# Patient Record
Sex: MALE | Race: WHITE | NOT HISPANIC OR LATINO | Employment: OTHER | URBAN - METROPOLITAN AREA
[De-identification: names, ages, dates, MRNs, and addresses within clinical notes are randomized per-mention and may not be internally consistent; named-entity substitution may affect disease eponyms.]

---

## 2018-09-06 ENCOUNTER — RECORDS - HEALTHEAST (OUTPATIENT)
Dept: LAB | Facility: CLINIC | Age: 33
End: 2018-09-06

## 2018-09-07 LAB — TSH SERPL DL<=0.005 MIU/L-ACNC: 1.37 UIU/ML (ref 0.3–5)

## 2018-11-27 ENCOUNTER — RECORDS - HEALTHEAST (OUTPATIENT)
Dept: LAB | Facility: CLINIC | Age: 33
End: 2018-11-27

## 2018-11-28 LAB
AMPHETAMINES UR QL SCN: ABNORMAL
BARBITURATES UR QL: ABNORMAL
BENZODIAZ UR QL: ABNORMAL
CANNABINOIDS UR QL SCN: ABNORMAL
COCAINE UR QL: ABNORMAL
CREAT UR-MCNC: 163.8 MG/DL
OPIATES UR QL SCN: ABNORMAL
OXYCODONE UR QL: ABNORMAL
PCP UR QL SCN: ABNORMAL

## 2019-03-26 ENCOUNTER — RECORDS - HEALTHEAST (OUTPATIENT)
Dept: LAB | Facility: CLINIC | Age: 34
End: 2019-03-26

## 2019-03-27 LAB
ALBUMIN SERPL-MCNC: 4.2 G/DL (ref 3.5–5)
ALP SERPL-CCNC: 65 U/L (ref 45–120)
ALT SERPL W P-5'-P-CCNC: 10 U/L (ref 0–45)
ANION GAP SERPL CALCULATED.3IONS-SCNC: 9 MMOL/L (ref 5–18)
AST SERPL W P-5'-P-CCNC: 16 U/L (ref 0–40)
BILIRUB SERPL-MCNC: 0.3 MG/DL (ref 0–1)
BUN SERPL-MCNC: 13 MG/DL (ref 8–22)
CALCIUM SERPL-MCNC: 9.4 MG/DL (ref 8.5–10.5)
CHLORIDE BLD-SCNC: 102 MMOL/L (ref 98–107)
CO2 SERPL-SCNC: 29 MMOL/L (ref 22–31)
CREAT SERPL-MCNC: 0.98 MG/DL (ref 0.7–1.3)
ERYTHROCYTE [DISTWIDTH] IN BLOOD BY AUTOMATED COUNT: 12.4 % (ref 11–14.5)
GFR SERPL CREATININE-BSD FRML MDRD: >60 ML/MIN/1.73M2
GLUCOSE BLD-MCNC: 97 MG/DL (ref 70–125)
HCT VFR BLD AUTO: 38.8 % (ref 40–54)
HGB BLD-MCNC: 12.6 G/DL (ref 14–18)
LIPASE SERPL-CCNC: 12 U/L (ref 0–52)
MCH RBC QN AUTO: 30 PG (ref 27–34)
MCHC RBC AUTO-ENTMCNC: 32.5 G/DL (ref 32–36)
MCV RBC AUTO: 92 FL (ref 80–100)
PLATELET # BLD AUTO: 227 THOU/UL (ref 140–440)
PMV BLD AUTO: 10.5 FL (ref 8.5–12.5)
POTASSIUM BLD-SCNC: 3.7 MMOL/L (ref 3.5–5)
PROT SERPL-MCNC: 6.9 G/DL (ref 6–8)
RBC # BLD AUTO: 4.2 MILL/UL (ref 4.4–6.2)
SODIUM SERPL-SCNC: 140 MMOL/L (ref 136–145)
TSH SERPL DL<=0.005 MIU/L-ACNC: 1.04 UIU/ML (ref 0.3–5)
WBC: 5.5 THOU/UL (ref 4–11)

## 2019-06-24 ENCOUNTER — RECORDS - HEALTHEAST (OUTPATIENT)
Dept: ADMINISTRATIVE | Facility: OTHER | Age: 34
End: 2019-06-24

## 2019-06-25 ENCOUNTER — AMBULATORY - HEALTHEAST (OUTPATIENT)
Dept: SURGERY | Facility: CLINIC | Age: 34
End: 2019-06-25

## 2019-06-25 DIAGNOSIS — K40.90 LEFT INGUINAL HERNIA: ICD-10-CM

## 2019-07-02 ENCOUNTER — AMBULATORY - HEALTHEAST (OUTPATIENT)
Dept: SURGERY | Facility: CLINIC | Age: 34
End: 2019-07-02

## 2019-07-02 RX ORDER — SERTRALINE HYDROCHLORIDE 100 MG/1
200 TABLET, FILM COATED ORAL DAILY
Status: SHIPPED | COMMUNITY
Start: 2019-07-02

## 2019-07-02 RX ORDER — QUETIAPINE FUMARATE 25 MG/1
50 TABLET, FILM COATED ORAL 2 TIMES DAILY
Status: SHIPPED | COMMUNITY
Start: 2019-07-02

## 2019-07-02 RX ORDER — GABAPENTIN 800 MG/1
1600 TABLET ORAL 2 TIMES DAILY
Status: SHIPPED | COMMUNITY
Start: 2018-11-02

## 2019-07-02 RX ORDER — OXYCODONE AND ACETAMINOPHEN 5; 325 MG/1; MG/1
1 TABLET ORAL EVERY 6 HOURS PRN
Status: SHIPPED | COMMUNITY
Start: 2009-06-14

## 2019-07-02 RX ORDER — IBUPROFEN 800 MG/1
800 TABLET, FILM COATED ORAL EVERY 6 HOURS PRN
Status: SHIPPED | COMMUNITY
Start: 2019-07-02

## 2019-07-02 RX ORDER — METHADONE HYDROCHLORIDE 5 MG/1
10 TABLET ORAL EVERY 12 HOURS
Status: SHIPPED | COMMUNITY
Start: 2019-01-08

## 2019-09-06 ENCOUNTER — RECORDS - HEALTHEAST (OUTPATIENT)
Dept: ADMINISTRATIVE | Facility: OTHER | Age: 34
End: 2019-09-06

## 2019-09-06 ENCOUNTER — RECORDS - HEALTHEAST (OUTPATIENT)
Dept: LAB | Facility: CLINIC | Age: 34
End: 2019-09-06

## 2019-09-06 LAB
ALBUMIN SERPL-MCNC: 3.7 G/DL (ref 3.5–5)
ALP SERPL-CCNC: 86 U/L (ref 45–120)
ALT SERPL W P-5'-P-CCNC: 25 U/L (ref 0–45)
ANION GAP SERPL CALCULATED.3IONS-SCNC: 12 MMOL/L (ref 5–18)
AST SERPL W P-5'-P-CCNC: 28 U/L (ref 0–40)
BILIRUB SERPL-MCNC: 0.3 MG/DL (ref 0–1)
BUN SERPL-MCNC: 15 MG/DL (ref 8–22)
CALCIUM SERPL-MCNC: 9.4 MG/DL (ref 8.5–10.5)
CHLORIDE BLD-SCNC: 98 MMOL/L (ref 98–107)
CO2 SERPL-SCNC: 27 MMOL/L (ref 22–31)
CREAT SERPL-MCNC: 0.92 MG/DL (ref 0.7–1.3)
ERYTHROCYTE [SEDIMENTATION RATE] IN BLOOD BY WESTERGREN METHOD: 87 MM/HR (ref 0–15)
GFR SERPL CREATININE-BSD FRML MDRD: >60 ML/MIN/1.73M2
GLUCOSE BLD-MCNC: 138 MG/DL (ref 70–125)
POTASSIUM BLD-SCNC: 3.9 MMOL/L (ref 3.5–5)
PROT SERPL-MCNC: 7.3 G/DL (ref 6–8)
SODIUM SERPL-SCNC: 137 MMOL/L (ref 136–145)

## 2019-09-08 LAB
B BURGDOR IGG+IGM SER QL: 0.03 INDEX VALUE
CMV IGG SERPL IA-ACNC: >8 AI (ref 0–0.8)
EBV EA-D IGG SER-ACNC: 0.7 AI (ref 0–0.8)
EBV NA IGG SER IA-ACNC: 3.5 AI (ref 0–0.8)
EBV VCA IGG SER IA-ACNC: >8 AI (ref 0–0.8)

## 2019-09-09 ENCOUNTER — COMMUNICATION - HEALTHEAST (OUTPATIENT)
Dept: ADMINISTRATIVE | Facility: CLINIC | Age: 34
End: 2019-09-09

## 2019-09-11 ENCOUNTER — COMMUNICATION - HEALTHEAST (OUTPATIENT)
Dept: ADMINISTRATIVE | Facility: CLINIC | Age: 34
End: 2019-09-11

## 2019-09-11 ENCOUNTER — RECORDS - HEALTHEAST (OUTPATIENT)
Dept: ADMINISTRATIVE | Facility: OTHER | Age: 34
End: 2019-09-11

## 2019-09-12 ENCOUNTER — OFFICE VISIT - HEALTHEAST (OUTPATIENT)
Dept: INFECTIOUS DISEASES | Facility: CLINIC | Age: 34
End: 2019-09-12

## 2019-09-12 ENCOUNTER — RECORDS - HEALTHEAST (OUTPATIENT)
Dept: ADMINISTRATIVE | Facility: OTHER | Age: 34
End: 2019-09-12

## 2019-09-12 ENCOUNTER — AMBULATORY - HEALTHEAST (OUTPATIENT)
Dept: LAB | Facility: CLINIC | Age: 34
End: 2019-09-12

## 2019-09-12 ENCOUNTER — COMMUNICATION - HEALTHEAST (OUTPATIENT)
Dept: ADMINISTRATIVE | Facility: CLINIC | Age: 34
End: 2019-09-12

## 2019-09-12 DIAGNOSIS — R50.9 FEVER, UNSPECIFIED FEVER CAUSE: ICD-10-CM

## 2019-09-12 LAB
ALBUMIN SERPL-MCNC: 3 G/DL (ref 3.5–5)
ALP SERPL-CCNC: 105 U/L (ref 45–120)
ALT SERPL W P-5'-P-CCNC: 33 U/L (ref 0–45)
ANION GAP SERPL CALCULATED.3IONS-SCNC: 18 MMOL/L (ref 5–18)
AST SERPL W P-5'-P-CCNC: 44 U/L (ref 0–40)
BASOPHILS # BLD AUTO: 0 THOU/UL (ref 0–0.2)
BASOPHILS NFR BLD AUTO: 0 % (ref 0–2)
BILIRUB SERPL-MCNC: 0.2 MG/DL (ref 0–1)
BUN SERPL-MCNC: 16 MG/DL (ref 8–22)
C REACTIVE PROTEIN LHE: 41 MG/DL (ref 0–0.8)
CALCIUM SERPL-MCNC: 10 MG/DL (ref 8.5–10.5)
CHLORIDE BLD-SCNC: 99 MMOL/L (ref 98–107)
CO2 SERPL-SCNC: 26 MMOL/L (ref 22–31)
CREAT SERPL-MCNC: 0.76 MG/DL (ref 0.7–1.3)
EOSINOPHIL # BLD AUTO: 0.1 THOU/UL (ref 0–0.4)
EOSINOPHIL NFR BLD AUTO: 1 % (ref 0–6)
ERYTHROCYTE [DISTWIDTH] IN BLOOD BY AUTOMATED COUNT: 13.1 % (ref 11–14.5)
ERYTHROCYTE [SEDIMENTATION RATE] IN BLOOD BY WESTERGREN METHOD: 95 MM/HR (ref 0–15)
GFR SERPL CREATININE-BSD FRML MDRD: >60 ML/MIN/1.73M2
GLUCOSE BLD-MCNC: 100 MG/DL (ref 70–125)
HCT VFR BLD AUTO: 37.3 % (ref 40–54)
HGB BLD-MCNC: 12 G/DL (ref 14–18)
LDH SERPL L TO P-CCNC: 619 U/L (ref 125–220)
LIPASE SERPL-CCNC: <9 U/L (ref 0–52)
LYMPHOCYTES # BLD AUTO: 0.6 THOU/UL (ref 0.8–4.4)
LYMPHOCYTES NFR BLD AUTO: 5 % (ref 20–40)
MCH RBC QN AUTO: 29.6 PG (ref 27–34)
MCHC RBC AUTO-ENTMCNC: 32.2 G/DL (ref 32–36)
MCV RBC AUTO: 92 FL (ref 80–100)
MONOCYTES # BLD AUTO: 0.2 THOU/UL (ref 0–0.9)
MONOCYTES NFR BLD AUTO: 1 % (ref 2–10)
NEUTROPHILS # BLD AUTO: 11.8 THOU/UL (ref 2–7.7)
NEUTROPHILS NFR BLD AUTO: 92 % (ref 50–70)
PLATELET # BLD AUTO: 487 THOU/UL (ref 140–440)
PMV BLD AUTO: 10.2 FL (ref 8.5–12.5)
POTASSIUM BLD-SCNC: 3.8 MMOL/L (ref 3.5–5)
PROT SERPL-MCNC: 7 G/DL (ref 6–8)
RBC # BLD AUTO: 4.06 MILL/UL (ref 4.4–6.2)
SODIUM SERPL-SCNC: 143 MMOL/L (ref 136–145)
WBC: 12.9 THOU/UL (ref 4–11)

## 2019-09-13 ENCOUNTER — AMBULATORY - HEALTHEAST (OUTPATIENT)
Dept: FAMILY MEDICINE | Facility: CLINIC | Age: 34
End: 2019-09-13

## 2019-09-13 ENCOUNTER — RECORDS - HEALTHEAST (OUTPATIENT)
Dept: RADIOLOGY | Facility: CLINIC | Age: 34
End: 2019-09-13

## 2019-09-13 ENCOUNTER — COMMUNICATION - HEALTHEAST (OUTPATIENT)
Dept: INFECTIOUS DISEASES | Facility: CLINIC | Age: 34
End: 2019-09-13

## 2019-09-13 ENCOUNTER — HOSPITAL ENCOUNTER (OUTPATIENT)
Dept: CT IMAGING | Facility: CLINIC | Age: 34
Discharge: HOME | End: 2019-09-13
Attending: INTERNAL MEDICINE

## 2019-09-13 DIAGNOSIS — R11.2 NAUSEA AND VOMITING, INTRACTABILITY OF VOMITING NOT SPECIFIED, UNSPECIFIED VOMITING TYPE: ICD-10-CM

## 2019-09-13 DIAGNOSIS — B34.9 VIRAL SYNDROME: ICD-10-CM

## 2019-09-13 LAB
B BURGDOR IGG+IGM SER QL: 0.03 INDEX VALUE
MALARIA SMEAR BLD: NORMAL
T PALLIDUM AB SER QL: NEGATIVE

## 2019-09-14 LAB
H CAPSUL AG UR QL IA: NOT DETECTED
H CAPSUL AG UR-MCNC: NOT DETECTED NG/ML

## 2019-09-15 LAB
A PHAGOCYTOPH DNA BLD QL NAA+PROBE: NOT DETECTED
E CHAFFEENSIS DNA BLD QL NAA+PROBE: NOT DETECTED
E EWINGII DNA SPEC QL NAA+PROBE: NOT DETECTED
EEEV IGG TITR SER IF: NORMAL {TITER}
EEEV IGM TITR SER IF: NORMAL {TITER}
EHRLICHIA DNA SPEC QL NAA+PROBE: NOT DETECTED
LACV IGG TITR SER IF: NORMAL {TITER}
LACV IGM TITR SER IF: NORMAL {TITER}
SLEV IGG TITR SER IF: NORMAL {TITER}
SLEV IGM TITR SER IF: NORMAL {TITER}
WEEV IGG TITR SER IF: NORMAL {TITER}
WEEV IGM TITR SER IF: NORMAL {TITER}
WNV IGG SER IA-ACNC: 0.17 IV
WNV IGM SER IA-ACNC: 0.02 IV

## 2019-09-16 ENCOUNTER — AMBULATORY - HEALTHEAST (OUTPATIENT)
Dept: PULMONOLOGY | Facility: OTHER | Age: 34
End: 2019-09-16

## 2019-09-16 DIAGNOSIS — J18.9 PNEUMONIA: ICD-10-CM

## 2019-09-17 LAB
AEROBIC BLOOD CULTURE BOTTLE: NO GROWTH
AEROBIC BLOOD CULTURE BOTTLE: NO GROWTH
ANAEROBIC BLOOD CULTURE BOTTLE: NO GROWTH
ANAEROBIC BLOOD CULTURE BOTTLE: NO GROWTH

## 2019-09-18 LAB
B MICROTI DNA BLD QL NAA+PROBE: NOT DETECTED
BABESIA DNA BLD QL NAA+PROBE: NOT DETECTED

## 2019-09-20 ENCOUNTER — RECORDS - HEALTHEAST (OUTPATIENT)
Dept: LAB | Facility: CLINIC | Age: 34
End: 2019-09-20

## 2019-09-20 LAB
C DIFF TOX B STL QL: NEGATIVE
RIBOTYPE 027/NAP1/BI: NORMAL

## 2019-09-26 ENCOUNTER — OFFICE VISIT - HEALTHEAST (OUTPATIENT)
Dept: INFECTIOUS DISEASES | Facility: CLINIC | Age: 34
End: 2019-09-26

## 2019-09-26 DIAGNOSIS — J67.9 HYPERSENSITIVITY PNEUMONITIS (H): ICD-10-CM

## 2019-10-09 ENCOUNTER — HOSPITAL ENCOUNTER (OUTPATIENT)
Dept: CT IMAGING | Facility: HOSPITAL | Age: 34
Discharge: HOME | End: 2019-10-09
Attending: INTERNAL MEDICINE

## 2019-10-09 DIAGNOSIS — J18.9 PNEUMONIA: ICD-10-CM

## 2019-10-10 ENCOUNTER — OFFICE VISIT - HEALTHEAST (OUTPATIENT)
Dept: PULMONOLOGY | Facility: OTHER | Age: 34
End: 2019-10-10

## 2019-10-10 DIAGNOSIS — R91.8 PULMONARY INFILTRATES: ICD-10-CM

## 2019-10-10 DIAGNOSIS — J69.1: ICD-10-CM

## 2019-10-10 DIAGNOSIS — F17.200 TOBACCO USE DISORDER: ICD-10-CM

## 2019-10-10 ASSESSMENT — MIFFLIN-ST. JEOR: SCORE: 1508.25

## 2019-11-05 ENCOUNTER — HOSPITAL ENCOUNTER (OUTPATIENT)
Dept: RESPIRATORY THERAPY | Facility: HOSPITAL | Age: 34
Discharge: HOME | End: 2019-11-05
Attending: INTERNAL MEDICINE

## 2019-11-05 DIAGNOSIS — R91.8 PULMONARY INFILTRATES: ICD-10-CM

## 2019-11-05 LAB — HGB BLD-MCNC: 13.1 G/DL (ref 14–18)

## 2021-05-26 VITALS — SYSTOLIC BLOOD PRESSURE: 100 MMHG | TEMPERATURE: 98.2 F | DIASTOLIC BLOOD PRESSURE: 70 MMHG | HEART RATE: 100 BPM

## 2021-06-01 NOTE — TELEPHONE ENCOUNTER
[facility] (Katie on behalf of Dr Whitley) calling to report findings of patient's Xray taken today.    [phone] ask for Katie

## 2021-06-01 NOTE — TELEPHONE ENCOUNTER
Dipika     Pt LVM this AM @ 6am that he has important questions to ask before going to the ER. Please call him back @ [phone].

## 2021-06-01 NOTE — TELEPHONE ENCOUNTER
"I called the pt, he states the last 2 nights he has been experiencing constant \"churning\" in in stomach, abd pain, and cannot eat or drink without vomiting.The pt states that he is trying to eat ice chips slowly, however this makes him nauseated as well. I advised the pt to go to the ER because he is unable to eat and drink, and he stated he is getting weak. The pt denies wanting to go to the ER and just wants a CT of the abd. I informed I will speak to the MD and contact him back.   "

## 2021-06-01 NOTE — TELEPHONE ENCOUNTER
Dr. Jimenez ([facility]) would like a call back from the on call doctor.    [phone]

## 2021-06-01 NOTE — PROGRESS NOTES
I reviewed the CT chest with Dr. Chanel, pulmonology. He has diffuse infiltrates in both lungs. I am concerned for a rapidly progressive process. Infection possibly, but cannot rule-out hypersensitivity reaction due to h/o E-cigarettes.     I called patient to come to the ER. I informed the ER that he is coming. Recommend starting broad-spectrum antibiotics and procalcitonin. He will likely need a bronchoscopy tomorrow, and this was discussed with Dr. Chanel.    Of note, I called the patient as he was completing an abdominal CT for ongoing nausea and abdominal pain. Results are pending.    ID will also be consulted after admission.    Ryan Bar MD  [facility]  Direct messaging: [facility] Paging  On-Call ID provider: [phone], option: [phone]

## 2021-06-01 NOTE — TELEPHONE ENCOUNTER
External - Quintenra Abundio Huber  Referring Provider: Raphael Eng MD  DX: Recurring fever, high sed rate, cmv, and EBV titers.     Ref./rec. Were received on 09/11/2019 in Consult folder   records printed and on css desk

## 2021-06-01 NOTE — TELEPHONE ENCOUNTER
Per Dr Bar, he had spoke to pt and informed of xray reviewed.  Called pt to help schedule CT at [facility] [place]note 2[place] sometime today. Faxed order [phone]

## 2021-06-01 NOTE — PROGRESS NOTES
"[facility] INFECTIOUS DISEASE CLINIC     Date: 9/26/2019  Patient Name: Cresencio Ruiz   YOB: 1985  MRN: [id number]      ASSESSMENT:  34-year-old man referred to ID clinic for about 2 weeks of fevers, nausea, and weight loss.  CXR and chest CT showed pneumonia vs pneumonitis. He was admitted to the hospital and thought to have hypersensitivity pneumonitis secondary to e-cigarette use. Antibiotics were discontinued and he was given prednisone. His fevers have stopped and he is beginning to gain weight again. All other infectious work-up was negative.      PLAN:  -continue prednisone taper as directed  -avoid dairy products for awhile and re-introduce slowly  -f/up with pulmonary with repeat CT, scheduled    Return to clinic as needed    Ryan Bar MD  [facility]   Clinic phone: [phone]   Clinic fax: [phone]     ______________________________________________________________________    HISTORY OF PRESENT ILLNESS:   From initial visit:    Cresencio Ruiz is a 34 y.o. man who is referred for evaluation of fevers.  Patient has a history of anxiety, chronic pain OCD, PTSD, and restless leg syndrome.  He was in his normal state of health up until Labor Day developed acute onset of chills and fevers.  He reports daily fevers in the evening.  He also has a \"churning\" feeling in his abdomen, occasionally vomiting after meals.  He has intermittent headache.  He denies sore throat, but does have dry mouth, and is drinking a lot of water.  He has subjective shortness of breath, but no cough or wheezing.  He is normally constipated, but he has been having looser stools lately.  He reports about 12 pound weight loss since his symptoms started. He denies any new medications.  He denies any known sick contacts.  He lives in [place] with his parents.  His fiancée lives in [place], and he cuts her grass.  He also went paddle boating [place] [facility] [place] [place], but denies other " significant outdoor exposures.  He has 2 cats and 2 dogs.  He quit smoking, but currently uses e-cigarettes.  He occasionally uses marijuana, last time was about a week or 2 ago.  He denies alcohol use or other illicit drugs.    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  Reportedly this was a very large tumor and he suffers chronic pain from this area.  He gets yearly chest x-rays for surveillance.    Interval History:  Following his clinic visit, his x-ray was concerning for pneumonia.  Because of his e-cigarette use, he was sent for a CT scan of the chest which showed bilateral opacities.  He was directed to the ER for admission.  They are, his O2 sats were 85%.  His procalcitonin was normal.  He was seen by pulmonary and ID.  His presentation was highly suggestive of hypersensitivity pneumonitis.  Antibiotics were discontinued.  Prednisone was started.  He was discharged within a few days and is feeling great.  Denies any fevers.  Feels that he has gained 5 pounds already.  He has some sensitivity to dairy right now.  He denies any shortness of breath or cough.  He is still on a prednisone taper.  He has an appointment in a few weeks with pulmonary for repeat CT scan.      Review of Systems:  No fevers no rashes    Past Medical History:  Past Medical History:   Diagnosis Date     Adhesive arachnoiditis      Anxiety      Chest pain      Chronic pain syndrome      Controlled substance agreement signed      VASILE (generalized anxiety disorder)      Intracranial injury (H)      Memory loss      Mixed obsessional thoughts and acts      OCD (obsessive compulsive disorder)      PTSD (post-traumatic stress disorder)      Restless leg      Rib pain on right side     chronic, pain after tumor removal, possible arachnoiditis     Seizure disorder (H)     post traumatic, single episode       Past Surgical History:  Past Surgical History:   Procedure Laterality Date     ganglioneuroma  2009     right lung     INGUINAL HERNIA REPAIR  2014    right       Allergies:  Allergies   Allergen Reactions     Morphine      Other reaction(s): Agitation       Medications:    Current Outpatient Medications:      dimenhyDRINATE (DRAMAMINE) 50 mg Chew, Chew 50 mg every 12 (twelve) hours.    , Disp: , Rfl:      famotidine (PEPCID) 20 MG tablet, Take 1 tablet (20 mg total) by mouth 2 (two) times a day., Disp: , Rfl: 0     gabapentin (NEURONTIN) 800 MG tablet, Take 1,600 mg by mouth 2 (two) times a day.    , Disp: , Rfl:      methadone (DOLOPHINE) 5 MG tablet, Take 10 mg by mouth every 12 (twelve) hours.    , Disp: , Rfl:      PREDNISONE ORAL, Take by mouth 2 (two) times a day. Tapering off. Last day 9/30/19, Disp: , Rfl:      QUEtiapine (SEROQUEL) 25 MG tablet, Take 50 mg by mouth 2 (two) times a day.    , Disp: , Rfl:      sertraline (ZOLOFT) 100 MG tablet, Take 200 mg by mouth daily.    , Disp: , Rfl:      acetaminophen (TYLENOL) 500 MG tablet, Take 1,000 mg by mouth every 4 (four) hours as needed for pain., Disp: , Rfl:      ibuprofen (ADVIL,MOTRIN) 800 MG tablet, Take 800 mg by mouth every 6 (six) hours as needed for pain., Disp: , Rfl:      LORazepam (ATIVAN) 1 MG tablet, Take 1 mg by mouth 2 (two) times a day as needed for anxiety., Disp: , Rfl:      multivitamin/iron/folic acid (CENTRUM ORAL), Take 1 tablet by mouth daily.    , Disp: , Rfl:      naloxone (NARCAN) 4 mg/actuation nasal spray, USE 4MG INTRANASALY ONCE AS NEEDED, Disp: , Rfl:      ondansetron (ZOFRAN ODT) 4 MG disintegrating tablet, Take 1 tablet (4 mg total) by mouth every 8 (eight) hours as needed for nausea., Disp: 12 tablet, Rfl: 0     oxyCODONE-acetaminophen (PERCOCET/ENDOCET) 5-325 mg per tablet, Take 1 tablet by mouth every 6 (six) hours as needed.    , Disp: , Rfl:     Social History:  Social History     Socioeconomic History     Marital status: Single     Spouse name: Not on file     Number of children: Not on file     Years of education: Not  on file     Highest education level: Not on file   Occupational History     Not on file   Social Needs     Financial resource strain: Not on file     Food insecurity:     Worry: Not on file     Inability: Not on file     Transportation needs:     Medical: Not on file     Non-medical: Not on file   Tobacco Use     Smoking status: Former Smoker     Smokeless tobacco: Former User     Quit date: 9/11/2019     Tobacco comment: 1-5 years, ecig daily use until a few days ago   Substance and Sexual Activity     Alcohol use: Not Currently     Frequency: Never     Comment: occassional beer     Drug use: Yes     Types: Marijuana     Comment: used marijuana 2-3 weeks ago     Sexual activity: Not on file   Lifestyle     Physical activity:     Days per week: Not on file     Minutes per session: Not on file     Stress: Not on file   Relationships     Social connections:     Talks on phone: Not on file     Gets together: Not on file     Attends [employer] service: Not on file     Active member of club or organization: Not on file     Attends meetings of clubs or organizations: Not on file     Relationship status: Not on file     Intimate partner violence:     Fear of current or ex partner: Not on file     Emotionally abused: Not on file     Physically abused: Not on file     Forced sexual activity: Not on file   Other Topics Concern     Not on file   Social History Narrative     Not on file        Family History:  Family History   Problem Relation Age of Onset     Depression Father            PHYSICAL EXAM:    Vitals:    09/26/19 1045   BP: 102/74   Pulse: 80   Temp: 98  F (36.7  C)       GENERAL: No distress  HENT:  Head is normocephalic, atraumatic.   EYES:  Eyes have anicteric sclerae without conjunctival injection   LUNGS:  Clear to auscultation.  CARDIOVASCULAR:  Regular rate and rhythm with no murmurs, gallops or rubs.  SKIN:  No acute rashes. No stigmata of endocarditis.  NEUROLOGIC: Grossly nonfocal. Normal gait and  station        Pertinent labs:            Lab Results   Component Value Date    CRP 41.0 (H) 09/12/2019         Lab Results   Component Value Date    ALT 33 09/12/2019    AST 44 (H) 09/12/2019    ALKPHOS 105 09/12/2019    BILITOT 0.2 09/12/2019     Outside labs from 9/6: WBC 9.5, hemoglobin 12, platelets 212, elevated neutrophils      Sed rate 87,  HIV negative  Lyme screen negative  CMV IgG positive  EBV IgG positive, EBNA positive    MICROBIOLOGY DATA:  Reviewed    RADIOLOGY:  Reviewed

## 2021-06-01 NOTE — PROGRESS NOTES
Impression from CT scan dated 9/12/19:   Extensive infiltrates throughout both lungs compatible with pneumonia, likely from infectious etiology. Other considerations would include edema from toxic inhalation or drug reaction.   Please advise if any further treatment or testing is needed. I am working with IT to get the report in Epic electronically.

## 2021-06-01 NOTE — PROGRESS NOTES
"[facility] INFECTIOUS DISEASE CLINIC     Date: 9/12/2019  Patient Name: Cresencio Ruiz   YOB: 1985  MRN: [id number]      ASSESSMENT:  34-year-old man referred to ID clinic for about 2 weeks of fevers, nausea, and weight loss.  Broad differential for fevers.  Given abrupt onset and acute symptoms, likely infectious.  No obvious exposures or travel.  No focal source of infection.  No obvious abnormalities on labs other than elevated sed rate.  Tick and mosquito borne illnesses are a concern.  Acute viral infection could present in this way (both respiratory viruses or arboviruses like West Nile).  CMV and EBV serologies indicate previous infection, likely not contributing to his current symptoms.    PLAN:  -Empiric treatment with doxycycline x2 weeks  -Blood cultures x2, parasite smear, Lyme serologies, Anaplasma/Ehrlichia PCR, Babesia PCR, syphilis screen, urine histoplasma antigen, CRP, ESR, CMP, CBC with differential, LDH  -Chest x-ray  -Continue ibuprofen fevers unless this is causing stomach upset can also combine with acetaminophen.    Return to clinic in 2 weeks.    Ryan Bar MD  [facility]   Clinic phone: [phone]   Clinic fax: [phone]     ______________________________________________________________________    HISTORY OF PRESENT ILLNESS:   Cresencio Ruiz is a 34 y.o. man who is referred for evaluation of fevers.  Patient has a history of anxiety, chronic pain OCD, PTSD, and restless leg syndrome.  He was in his normal state of health up until Labor Day developed acute onset of chills and fevers.  He reports daily fevers in the evening.  He also has a \"churning\" feeling in his abdomen, occasionally vomiting after meals.  He has intermittent headache.  He denies sore throat, but does have dry mouth, and is drinking a lot of water.  He has subjective shortness of breath, but no cough or wheezing.  He is normally constipated, but he has been having looser stools " lately.  He reports about 12 pound weight loss since his symptoms started. He denies any new medications.  He denies any known sick contacts.  He lives in [place] with his parents.  His fiancée lives in [place], and he cuts her grass.  He also [place] [place], but denies other significant outdoor exposures.  He has 2 cats and 2 dogs.  He quit smoking, but currently uses e-cigarettes.  He occasionally uses marijuana, last time was about a week or 2 ago.  He denies alcohol use or other illicit drugs.    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  Reportedly this was a very large tumor and he suffers chronic pain from this area.  He gets yearly chest x-rays for surveillance.    Interval History:  Not applicable      Review of Systems:  Ten systems reviewed and negative except for what is noted in the HPI     Past Medical History:  Past Medical History:   Diagnosis Date     Adhesive arachnoiditis      Anxiety      Chest pain      Chronic pain syndrome      Controlled substance agreement signed      VASILE (generalized anxiety disorder)      Intracranial injury (H)      Memory loss      Mixed obsessional thoughts and acts      OCD (obsessive compulsive disorder)      PTSD (post-traumatic stress disorder)      Restless leg      Rib pain on right side     chronic, pain after tumor removal, possible arachnoiditis     Seizure disorder (H)     post traumatic, single episode       Past Surgical History:  Past Surgical History:   Procedure Laterality Date     ganglioneuroma  2009    right lung     INGUINAL HERNIA REPAIR  2014    right       Allergies:  Allergies   Allergen Reactions     Morphine      Other reaction(s): Agitation       Medications:    Current Outpatient Medications:      dimenhyDRINATE (DRAMAMINE) 50 mg Chew, Chew 50 mg every 6 (six) hours., Disp: , Rfl:      gabapentin (NEURONTIN) 800 MG tablet, Take 1,600 mg by mouth., Disp: , Rfl:       ibuprofen (ADVIL,MOTRIN) 800 MG tablet, Take 800 mg by mouth every 6 (six) hours as needed for pain., Disp: , Rfl:      LORazepam (ATIVAN) 1 MG tablet, TAKE 1/2 TO 1 TABLET BY MOUTH 3 TIMES DAILY AS NEEDED FOR ANXIETY, Disp: , Rfl: 0     methadone (DOLOPHINE) 5 MG tablet, , Disp: , Rfl:      multivitamin/iron/folic acid (CENTRUM ORAL), Take by mouth., Disp: , Rfl:      naloxone (NARCAN) 4 mg/actuation nasal spray, USE 4MG INTRANASALY ONCE AS NEEDED, Disp: , Rfl:      ondansetron (ZOFRAN ODT) 4 MG disintegrating tablet, Take 1 tablet (4 mg total) by mouth every 8 (eight) hours as needed for nausea., Disp: 12 tablet, Rfl: 0     oxyCODONE-acetaminophen (PERCOCET/ENDOCET) 5-325 mg per tablet, Take by mouth., Disp: , Rfl:      QUEtiapine (SEROQUEL) 25 MG tablet, Take 25 mg by mouth at bedtime., Disp: , Rfl:      sertraline (ZOLOFT) 100 MG tablet, Take 100 mg by mouth daily., Disp: , Rfl:     Social History:  Social History     Socioeconomic History     Marital status: Single     Spouse name: Not on file     Number of children: Not on file     Years of education: Not on file     Highest education level: Not on file   Occupational History     Not on file   Social Needs     Financial resource strain: Not on file     Food insecurity:     Worry: Not on file     Inability: Not on file     Transportation needs:     Medical: Not on file     Non-medical: Not on file   Tobacco Use     Smoking status: Former Smoker     Smokeless tobacco: Never Used     Tobacco comment: 1-5 years, ecig currently using   Substance and Sexual Activity     Alcohol use: Not Currently     Frequency: Never     Comment: occassional beer     Drug use: Not on file     Sexual activity: Not on file   Lifestyle     Physical activity:     Days per week: Not on file     Minutes per session: Not on file     Stress: Not on file   Relationships     Social connections:     Talks on phone: Not on file     Gets together: Not on file     Attends [employer] service: Not  on file     Active member of club or organization: Not on file     Attends meetings of clubs or organizations: Not on file     Relationship status: Not on file     Intimate partner violence:     Fear of current or ex partner: Not on file     Emotionally abused: Not on file     Physically abused: Not on file     Forced sexual activity: Not on file   Other Topics Concern     Not on file   Social History Narrative     Not on file        Family History:  Family History   Problem Relation Age of Onset     Depression Father            PHYSICAL EXAM:    Vitals:    09/12/19 1028   BP: 100/70   Pulse: 100   Temp: 98.2  F (36.8  C)       GENERAL: Thin man, uncomfortable, somewhat anxious  HENT:  Head is normocephalic, atraumatic. Oropharynx is moist without exudates or ulcers.  EYES:  Eyes have anicteric sclerae without conjunctival injection or stigmata of endocarditis.    Lymph: No cervical, axillary, or inguinal lymphadenopathy  LUNGS:  Clear to auscultation.  CARDIOVASCULAR:  Regular rate and rhythm with no murmurs, gallops or rubs.  ABDOMEN:  Normal bowel sounds, soft, nontender. No hepatosplenomegaly.  MUSCULOSKELETAL: Extremities warm and without edema. No joint swelling.  Tenderness to palpation around the site of his previous surgery on his back.  SKIN:  No acute rashes. No stigmata of endocarditis.  NEUROLOGIC: Grossly nonfocal. Normal gait and station        Pertinent labs:        Results from last 7 days   Lab Units 09/06/19  1220   LN-SODIUM mmol/L 137   LN-POTASSIUM mmol/L 3.9   LN-CHLORIDE mmol/L 98   LN-CO2 mmol/L 27   LN-BLOOD UREA NITROGEN mg/dL 15   LN-CREATININE mg/dL 0.92   LN-CALCIUM mg/dL 9.4     No results found for: CRP      Lab Results   Component Value Date    ALT 25 09/06/2019    AST 28 09/06/2019    ALKPHOS 86 09/06/2019    BILITOT 0.3 09/06/2019     Outside labs from 9/6: WBC 9.5, hemoglobin 12, platelets 212, elevated neutrophils      Sed rate 87,  HIV negative  Lyme screen negative  CMV IgG  positive  EBV IgG positive, EBNA positive    MICROBIOLOGY DATA:  None    RADIOLOGY:  None

## 2021-06-01 NOTE — PROGRESS NOTES
Patient called worried about ongoing nausea and vomiting. Re-iterated recommendation to go to the ER for hydration, however patient and fiance are reluctant. Overall they feel he is getting fluids and keeping these down. He is making regular urine.    He started levofloxacin, but did not start doxycycline. I would still want to continue both of these.    I have ordered a CT abdomen due to ongoing nausea and vomiting, along with fevers.

## 2021-06-01 NOTE — PROGRESS NOTES
Chest xray reviewed with radiologist. Xray shows peribronchiolar inflammation in the RML and DANIEL. No obvious infiltrate to signify bacterial pneumonia.    Given ongoing illness, subjective shortness of breath (without cough), and h/o using E-cigarettes, I will order a Chest CT and add levofloxacin for better pneumonia coverage.    I attempted to call patient, but no answer. I left voice message to call clinic.    If respiratory symptoms worsen, he should come to the ER for admission.

## 2021-06-02 NOTE — PROGRESS NOTES
[facility] Visit    Problems Addressed Today  Problem List Items Addressed This Visit     Tobacco use disorder    Lipoid pneumonia (exogenous) (H)      Other Visit Diagnoses     Pulmonary infiltrates    -  Primary    Relevant Orders    PFT Complete        Assessment: 34-year-old man with pneumonia in the setting of the cigarette use.  No pathology available but I will clinically call us lipoid pneumonia because of its association with the cigarettes and I do not think it will be helpful for him to have hypersensitivity pneumonitis on his medical record.  Overall he is much improved.  He still has some pulmonary infiltrates but much less.    I anticipate that his course will mirror that of a short-term occupational exposure rather than a lipoid pneumonia associated with long-term occupational exposure.  Because of the lack of knowledge about the clinical course of this particular exposure we will check a set of formal pulmonary function test now.  Repeat them in 3 months with a chest x-ray.  We will determine further CT scans based on his clinical picture and pulmonary function tests.    I appreciate Dr. Eng providing further prednisone taper after his discharge from the hospital.  I think this was a good idea    Plans and recommendations:  No further prednisone for now  PFT now in 3 months  Chest x-ray in 3 months    Chief Complaint: Pneumonia    HPI: 34-year-old man admitted with e-cigarette related pneumonia last month.  He had significant fevers, shortness of breath and pulmonary infiltrates.  These improved with prednisone which was tapered after his discharge.  He has a little bit of abdominal pain but no joint symptoms no eye symptoms no skin symptoms.  He has chronic pain but no changes.  No other provocative, palliative or localizing factors.    Current Outpatient Medications on File Prior to Visit   Medication Sig Dispense Refill     acetaminophen (TYLENOL) 500 MG tablet Take 1,000  mg by mouth every 4 (four) hours as needed for pain.       dimenhyDRINATE (DRAMAMINE) 50 mg Chew Chew 50 mg every 12 (twelve) hours.              gabapentin (NEURONTIN) 800 MG tablet Take 1,600 mg by mouth 2 (two) times a day.              ibuprofen (ADVIL,MOTRIN) 800 MG tablet Take 800 mg by mouth every 6 (six) hours as needed for pain.       LORazepam (ATIVAN) 1 MG tablet Take 1 mg by mouth 2 (two) times a day as needed for anxiety.       methadone (DOLOPHINE) 5 MG tablet Take 10 mg by mouth every 12 (twelve) hours.              multivitamin/iron/folic acid (CENTRUM ORAL) Take 1 tablet by mouth daily.              naloxone (NARCAN) 4 mg/actuation nasal spray USE 4MG INTRANASALY ONCE AS NEEDED       oxyCODONE-acetaminophen (PERCOCET/ENDOCET) 5-325 mg per tablet Take 1 tablet by mouth every 6 (six) hours as needed.              QUEtiapine (SEROQUEL) 25 MG tablet Take 50 mg by mouth 2 (two) times a day.              sertraline (ZOLOFT) 100 MG tablet Take 200 mg by mouth daily.              [DISCONTINUED] famotidine (PEPCID) 20 MG tablet Take 1 tablet (20 mg total) by mouth 2 (two) times a day.  0     [DISCONTINUED] ondansetron (ZOFRAN ODT) 4 MG disintegrating tablet Take 1 tablet (4 mg total) by mouth every 8 (eight) hours as needed for nausea. 12 tablet 0     [DISCONTINUED] PREDNISONE ORAL Take by mouth 2 (two) times a day. Tapering off. Last day 9/30/19       No current facility-administered medications on file prior to visit.        Review of Systems: Negative x12 symptoms except as listed above  Medical History  Active Ambulatory (Non-Hospital) Problems    Diagnosis     Chest pain, unspecified     Anxiety state     Chronic pain disorder     VASILE (generalized anxiety disorder)     Mixed obsessional thoughts and acts     Toxic effect of other organic solvents, undetermined, initial encounter     Hypersensitivity pneumonitis (H)     Hypoxia     Tobacco use disorder     Viral syndrome     S/P thoracotomy     Past  "Medical History:   Diagnosis Date     Adhesive arachnoiditis      Anxiety      Chest pain      Chronic pain syndrome      Controlled substance agreement signed      VASILE (generalized anxiety disorder)      Intracranial injury (H)      Memory loss      Mixed obsessional thoughts and acts      OCD (obsessive compulsive disorder)      PTSD (post-traumatic stress disorder)      Restless leg      Rib pain on right side      Seizure disorder (H)         Surgical History  He  has a past surgical history that includes ganglioneuroma (2009) and Inguinal hernia repair (2014).       Social History  Reviewed, and he  reports that he has quit smoking. He quit smokeless tobacco use about 4 weeks ago. He reports previous alcohol use. He reports current drug use. Drug: Marijuana.       Allergies  Allergies   Allergen Reactions     Morphine      Other reaction(s): Agitation    Family History  Reviewed, and family history includes Depression in his father.          /58   Pulse 76   Ht 5' 8\" (1.727 m)   Wt 132 lb (59.9 kg)   SpO2 97% Comment: RA  BMI 20.07 kg/m    General: calm, normal body habitus  Eyes: no scleral injection or icterus, pupils equal and round  Nose: patent nares  Mouth: oropharynx benign, MP2  Neck: supple, no lymph nodes  Chest: nontender to palpation, no deformity  Lungs: Clear to auscultation, no wheezes or crackles.  CV: palpable radial pulses, RRR, no m/r/g, normal PMI  Abd: soft, nontender  Ext: no clubbing, no ulnar deviation of digits  Neuro: alert and interactive, no tremor or abnormal movements  Pscyh: normal affect appropriate to clinical scenario, no hallucination      Data Review - imaging, labs, and ekgs listed below were reviewed by me.  Chest XRay and chest CT images and EKG tracings interpreted personally.     Past Labs  Lab Requisition on 09/20/2019   Component Date Value     C.Difficile Toxigenic by* 09/20/2019 Negative      Ribotype 027/NAP1/B1 09/20/2019 Presumptive Negative  "   Admission on 09/13/2019, Discharged on 09/16/2019   Component Date Value     Sodium 09/13/2019 139      Potassium 09/13/2019 3.8      Chloride 09/13/2019 97*     CO2 09/13/2019 29      Anion Gap, Calculation 09/13/2019 13      Glucose 09/13/2019 99      Calcium 09/13/2019 9.7      BUN 09/13/2019 14      Creatinine 09/13/2019 0.75      GFR MDRD Af Amer 09/13/2019 >60      GFR MDRD Non Af Amer 09/13/2019 >60      Lactic Acid 09/13/2019 1.2      Magnesium 09/13/2019 1.9      WBC 09/13/2019 10.7      RBC 09/13/2019 3.81*     Hemoglobin 09/13/2019 11.3*     Hematocrit 09/13/2019 35.0*     MCV 09/13/2019 92      MCH 09/13/2019 29.7      MCHC 09/13/2019 32.3      RDW 09/13/2019 13.2      Platelets 09/13/2019 449*     MPV 09/13/2019 9.1      Anaerobic Blood Culture * 09/13/2019 No Growth      Aerobic Blood Culture Alvino* 09/13/2019 No Growth      Procalcitonin 09/13/2019 0.12      Sodium 09/14/2019 145      Potassium 09/14/2019 3.9      Chloride 09/14/2019 106      CO2 09/14/2019 27      Anion Gap, Calculation 09/14/2019 12      Glucose 09/14/2019 134*     Calcium 09/14/2019 9.5      BUN 09/14/2019 12      Creatinine 09/14/2019 0.67*     GFR MDRD Af Amer 09/14/2019 >60      GFR MDRD Non Af Amer 09/14/2019 >60      WBC 09/14/2019 11.2*     RBC 09/14/2019 3.68*     Hemoglobin 09/14/2019 11.1*     Hematocrit 09/14/2019 34.1*     MCV 09/14/2019 93      MCH 09/14/2019 30.2      MCHC 09/14/2019 32.6      RDW 09/14/2019 13.2      Platelets 09/14/2019 471*     MPV 09/14/2019 9.2      Miscellanous Test Dept. 09/14/2019 Chemistry Reference Test      Test Name 09/14/2019 Blastomyces Antigen Quantitative by EIA, Urine       Performing Lab 09/14/2019 [facility]  [address] [place] [id number]        Scan Result 09/14/2019 See [facility] Miscellaneous Test for results      Blastomyces dermatitidis* 09/15/2019 None Detected      [facility] Miscellaneous Test 09/14/2019 SEE NOTE    Lab on 09/12/2019   Component Date Value      Lipase 09/12/2019 <9      Sodium 09/12/2019 143      Potassium 09/12/2019 3.8      Chloride 09/12/2019 99      CO2 09/12/2019 26      Anion Gap, Calculation 09/12/2019 18      Glucose 09/12/2019 100      BUN 09/12/2019 16      Creatinine 09/12/2019 0.76      GFR MDRD Af Amer 09/12/2019 >60      GFR MDRD Non Af Amer 09/12/2019 >60      Bilirubin, Total 09/12/2019 0.2      Calcium 09/12/2019 10.0      Protein, Total 09/12/2019 7.0      Albumin 09/12/2019 3.0*     Alkaline Phosphatase 09/12/2019 105      AST 09/12/2019 44*     ALT 09/12/2019 33      Anaplasma phagocytophilu* 09/12/2019 Not Detected      Ehrlichia chaffeensis by* 09/12/2019 Not Detected      Ehrlichia ewingii/canis * 09/12/2019 Not Detected      Ehrlichia muris-like by * 09/12/2019 Not Detected      Malaria Smear 09/12/2019 No Malarial parasites seen      CRP 09/12/2019 41.0*     Sed Rate 09/12/2019 95*     LD (LDH) 09/12/2019 619*     Treponema Antibody (Syph* 09/12/2019 Negative      WBC 09/12/2019 12.9*     RBC 09/12/2019 4.06*     Hemoglobin 09/12/2019 12.0*     Hematocrit 09/12/2019 37.3*     MCV 09/12/2019 92      MCH 09/12/2019 29.6      MCHC 09/12/2019 32.2      RDW 09/12/2019 13.1      Platelets 09/12/2019 487*     MPV 09/12/2019 10.2      Neutrophils % 09/12/2019 92*     Lymphocytes % 09/12/2019 5*     Monocytes % 09/12/2019 1*     Eosinophils % 09/12/2019 1      Basophils % 09/12/2019 0      Neutrophils Absolute 09/12/2019 11.8*     Lymphocytes Absolute 09/12/2019 0.6*     Monocytes Absolute 09/12/2019 0.2      Eosinophils Absolute 09/12/2019 0.1      Basophils Absolute 09/12/2019 0.0      Histoplasma Galactomanna* 09/12/2019 Not Detected      Histoplasma Galactomanna* 09/12/2019 Not Detected      Calif Encephalitis Antib* 09/12/2019 < 1:16      [place] Encephalitis * 09/12/2019 < 1:16      Eastern Equine Enceph Ab* 09/12/2019 < 1:16      Eastern Equine Enceph Ab* 09/12/2019 < 1:16      Addison Encephalitis An* 09/12/2019 < 1:16      [place] Encephalitis A* 09/12/2019 < 1:16      Western Equine Enceph Ab* 09/12/2019 < 1:16      Western Equine Enceph Ab* 09/12/2019 < 1:16      West Nile Virus Ab, IgG,* 09/12/2019 0.17      West Nile Virus Ab, IgM,* 09/12/2019 0.02    Office Visit on 09/12/2019   Component Date Value     Lyme Antibody [place] 09/12/2019 0.03      Babesia species by PCR 09/12/2019 Not Detected      Babesia microti by PCR 09/12/2019 Not Detected      Anaerobic Blood Culture * 09/12/2019 No Growth      Aerobic Blood Culture Alvino* 09/12/2019 No Growth      Anaerobic Blood Culture * 09/12/2019 No Growth      Aerobic Blood Culture Alvino* 09/12/2019 No Growth    Admission on 09/09/2019, Discharged on 09/09/2019   Component Date Value     HIV Antigen / Antibody 09/09/2019 Negative    Lab Requisition on 09/06/2019   Component Date Value     EBV Nuclear Agn Valentine IgG 09/06/2019 3.5*     EBV Capsid Antigen IgG 09/06/2019 >8.0*     EBV Valentine to Early Agn IgG 09/06/2019 0.7      CMV IgG Antibody 09/06/2019 >8.0*     Lyme Antibody [place] 09/06/2019 0.03      Sodium 09/06/2019 137      Potassium 09/06/2019 3.9      Chloride 09/06/2019 98      CO2 09/06/2019 27      Anion Gap, Calculation 09/06/2019 12      Glucose 09/06/2019 138*     BUN 09/06/2019 15      Creatinine 09/06/2019 0.92      GFR MDRD Af Amer 09/06/2019 >60      GFR MDRD Non Af Amer 09/06/2019 >60      Bilirubin, Total 09/06/2019 0.3      Calcium 09/06/2019 9.4      Protein, Total 09/06/2019 7.3      Albumin 09/06/2019 3.7      Alkaline Phosphatase 09/06/2019 86      AST 09/06/2019 28      ALT 09/06/2019 25      Sed Rate 09/06/2019 87*       * Cannot find OR log *    Past Imaging  CT chest reviewed and interpreted by me x2: CT chest from hospital admission with significant bilateral pulmonary infiltrates with subpleural sparing.  CT chest from today reviews significant improvement, still some apical and anterior upper lobe infiltrates bilaterally.  Significantly improved.  Xr Chest 2  Views    Result Date: 9/16/2019  EXAM: XR CHEST 2 VIEWS LOCATION: [facility] DATE/TIME: 9/16/2019 9:59 AM INDICATION: Respiratory failure secondary to e-cigarettes, hypoxia. COMPARISON: 9/12/2019     No significant change in the diffuse groundglass opacities with reticular nodular infiltrates present bilaterally. No change in post thoracotomy appearances of right lung. No new abnormality. Heart size remains normal.    Xr Chest 2 Views    Result Date: 9/12/2019  EXAM DATE:         09/12/2019 EXAM: X-RAY CHEST, 2 VIEWS, FRONTAL AND LATERAL LOCATION: [facility] DATE/TIME: 9/12/2019 12:15 PM INDICATION: Fever, unspecified COMPARISON: 1/16/2019 and older studies. IMPRESSION: Postthoracotomy changes on the right with skin staples and  right upper lobe resection again noted. Patient has developed patchy peribronchiolar opacities in the left upper lobe, lingula and in the right middle lobe consistent with bilateral bronchopneumonia. No effusions. Heart and pulmonary vascularity are normal. QC to call results. NOTE: ABNORMAL REPORT THE DICTATION ABOVE DESCRIBES AN ABNORMALITY FOR WHICH FOLLOW-UP IS NEEDED.     Ct Chest Without Contrast    Result Date: 10/9/2019  EXAM: CT CHEST WO CONTRAST LOCATION: [facility] DATE/TIME: 10/9/2019 4:13 PM INDICATION: Acute respiratory failure, E cigarette use. COMPARISON: 9/12/2019. TECHNIQUE: CT chest without IV contrast. Multiplanar reformats were obtained. Dose reduction techniques were used. CONTRAST: None. FINDINGS: LUNGS AND PLEURA: Significant improvement in bilateral pulmonary infiltrates with faint peribronchial groundglass nodular opacities still seen in both lungs, predominantly in the left upper lobe and right middle lobe. No pleural effusion. MEDIASTINUM/AXILLAE: Stable postoperative changes posterior mediastinum and right chest wall. No lymphadenopathy. UPPER ABDOMEN: No significant finding. MUSCULOSKELETAL: Unremarkable.     1.  Significant  improvement in bilateral peribronchial groundglass opacities when compared to previous.     Ct Chest Without Contrast    Result Date: 9/12/2019  EXAM DATE:         09/12/2019 EXAM: CT CHEST WITHOUT CONTRAST LOCATION: [facility] DATE/TIME: 9/12/2019 6:30 PM INDICATION: fever COMPARISON: Chest x-ray 09/12/2019 TECHNIQUE: Helical images were obtained through the chest. Multiplanar reformats were obtained. Dose reduction techniques were used. IV CONTRAST: None. FINDINGS: LUNGS AND PLEURA: Extensive reticulonodular and groundglass infiltrates throughout both upper and lower lobes. No pleural effusions. Prior right upper thoracotomy with pleural thickening along the upper right mediastinum, with scattered surgical clips. MEDIASTINUM: No enlarged mediastinal or hilar lymph nodes. LIMITED UPPER ABDOMEN: Negative. MUSCULOSKELETAL: Deformity involving the medial left third rib from prior surgery CONCLUSION: 1.  Extensive infiltrates throughout both lungs compatible with pneumonia, likely from infectious etiology. Other considerations would include edema from toxic inhalation or drug reaction. Pulmonary consultation recommended. 2.  Prior right upper thoracotomy. DICOM format image data is available to non-affiliated external healthcare facilities or entities on a secure, media-free, reciprocally searchable basis with patient authorization for at least a 12-month period after the study.     Ct External Imaging Chest    Result Date: 9/13/2019  Please see PACS Hyperlink for images and scanned result text.    Ct Abdomen Pelvis With Oral With Iv Contrast    Result Date: 9/13/2019  EXAM: CT ABDOMEN PELVIS W ORAL W IV CONTRAST LOCATION: [facility] DATE/TIME: 9/13/2019 2:39 PM INDICATION: Abdominal pain, acute, nonlocalized COMPARISON: Chest CT 9/12/2019, CT abdomen and pelvis 10/28/2012 TECHNIQUE: Helical enhanced thin-section CT scan of the abdomen and pelvis was performed following injection of IV  contrast. Multiplanar reformats were obtained. Dose reduction techniques were used. CONTRAST: Iohexol (Omni) 100 mL FINDINGS: LUNG BASES: Extensive interstitial infiltrates are unchanged from yesterday CT. ABDOMEN: Normal liver, spleen, pancreas, gallbladder, adrenal glands and kidneys. PELVIS: No free fluid or inflammatory changes. Normal appendix. MUSCULOSKELETAL: Stable ovoid calcifications within the spinal canal of the lower lumbar spine at the L4-5 levels.     CONCLUSION: 1.  No acute abnormality in the abdomen or pelvis. 2.  Stable intrathecal calcifications in the lower lumbar spine, of doubtful significance and unchanged since at least 2012. 3.  Interstitial pulmonary infiltrates, better seen on yesterday's chest CT.

## 2021-06-03 VITALS
DIASTOLIC BLOOD PRESSURE: 74 MMHG | BODY MASS INDEX: 19.46 KG/M2 | WEIGHT: 128 LBS | HEART RATE: 80 BPM | SYSTOLIC BLOOD PRESSURE: 102 MMHG | TEMPERATURE: 98 F

## 2021-06-03 VITALS
DIASTOLIC BLOOD PRESSURE: 58 MMHG | HEART RATE: 76 BPM | SYSTOLIC BLOOD PRESSURE: 112 MMHG | OXYGEN SATURATION: 97 % | WEIGHT: 132 LBS | HEIGHT: 68 IN | BODY MASS INDEX: 20 KG/M2

## 2021-06-03 NOTE — PROGRESS NOTES
RESPIRATORY CARE NOTE     Patient Name: Cresencio Ruiz  Today's Date: 11/5/2019     Complete PFT done. Pt performed tests with good effort, 2.5 mg Albuterol neb given. Test results meet ATS criteria. Results scanned into epic. Pt left in no distress.       Kenyatta Shaver, LRT

## 2021-06-16 PROBLEM — R07.9 CHEST PAIN, UNSPECIFIED: Status: ACTIVE | Noted: 2019-09-13

## 2021-06-16 PROBLEM — F41.1 ANXIETY STATE: Status: ACTIVE | Noted: 2019-09-13

## 2021-06-16 PROBLEM — J69.1: Status: ACTIVE | Noted: 2019-10-10

## 2021-06-16 PROBLEM — F41.1 GAD (GENERALIZED ANXIETY DISORDER): Status: ACTIVE | Noted: 2019-09-13

## 2021-06-16 PROBLEM — T52.8X4A: Status: ACTIVE | Noted: 2019-09-13

## 2021-06-16 PROBLEM — B34.9 VIRAL SYNDROME: Status: ACTIVE | Noted: 2019-09-09

## 2021-06-16 PROBLEM — F42.2 MIXED OBSESSIONAL THOUGHTS AND ACTS: Status: ACTIVE | Noted: 2019-09-13

## 2021-06-16 PROBLEM — G89.4 CHRONIC PAIN DISORDER: Status: ACTIVE | Noted: 2019-09-13

## 2021-06-19 NOTE — LETTER
Letter by Omar Farfan MD at      Author: Omar Farfan MD Service: -- Author Type: --    Filed:  Encounter Date: 10/10/2019 Status: Signed         October 10, 2019     Raphael Eng MD  [address] [facility] [address]    Patient: Cresencio Ruiz   MR Number: [id number]   YOB: 1985   Date of Visit: 10/10/2019       Dear Dr. Velasquez MD:    Thank you for referring Cresencio Ruiz to me for evaluation. Below are the relevant portions of my assessment and plan of care.  Assessment: 34-year-old man with pneumonia in the setting of the cigarette use.  No pathology available but I will clinically call us lipoid pneumonia because of its association with the cigarettes and I do not think it will be helpful for him to have hypersensitivity pneumonitis on his medical record.  Overall he is much improved.  He still has some pulmonary infiltrates but much less.    I anticipate that his course will mirror that of a short-term occupational exposure rather than a lipoid pneumonia associated with long-term occupational exposure.  Because of the lack of knowledge about the clinical course of this particular exposure we will check a set of formal pulmonary function test now.  Repeat them in 3 months with a chest x-ray.  We will determine further CT scans based on his clinical picture and pulmonary function tests.    I appreciate Dr. Eng providing further prednisone taper after his discharge from the hospital.  I think this was a good idea    Plans and recommendations:  No further prednisone for now  PFT now in 3 months  Chest x-ray in 3 months       If you have questions, please do not hesitate to call me. I look forward to following Cresencio along with you.    Sincerely,        Omar Farfan MD        CC  No Recipients

## 2021-06-19 NOTE — LETTER
Letter by Ryan Bar MD at      Author: Ryan Bar MD Service: -- Author Type: --    Filed:  Encounter Date: 9/26/2019 Status: Signed         Raphael Eng MD  [address]  [facility] [address]                                  September 26, 2019    Patient: Cresencio Ruiz   MR Number: [id number]   YOB: 1985   Date of Visit: 9/26/2019     Dear Dr. Velasquez MD:    Thank you for referring Cresencio Ruiz to me for evaluation. Below are the relevant portions of my assessment and plan of care.    If you have questions, please do not hesitate to call me. I look forward to following Cresencio along with you.    Sincerely,        Ryan Bar MD          CC  No Recipients  Ryan Bar MD  9/26/2019 11:05 AM  Sign when Signing Visit  [facility] INFECTIOUS DISEASE CLINIC     Date: 9/26/2019  Patient Name: Cresencio Ruiz   YOB: 1985  MRN: [id number]      ASSESSMENT:  34-year-old man referred to ID clinic for about 2 weeks of fevers, nausea, and weight loss.  CXR and chest CT showed pneumonia vs pneumonitis. He was admitted to the hospital and thought to have hypersensitivity pneumonitis secondary to e-cigarette use. Antibiotics were discontinued and he was given prednisone. His fevers have stopped and he is beginning to gain weight again. All other infectious work-up was negative.      PLAN:  -continue prednisone taper as directed  -avoid dairy products for awhile and re-introduce slowly  -f/up with pulmonary with repeat CT, scheduled    Return to clinic as needed    Ryan Bar MD  [facility]   Clinic phone: [phone]   Clinic fax: [phone]     ______________________________________________________________________    HISTORY OF PRESENT ILLNESS:   From initial visit:    Cresencio Ruiz is a 34 y.o. man who is referred for evaluation of fevers.  Patient has a history of anxiety, chronic pain OCD, PTSD, and restless leg syndrome.  He was in his  "normal state of health up until Labor Day developed acute onset of chills and fevers.  He reports daily fevers in the evening.  He also has a \"churning\" feeling in his abdomen, occasionally vomiting after meals.  He has intermittent headache.  He denies sore throat, but does have dry mouth, and is drinking a lot of water.  He has subjective shortness of breath, but no cough or wheezing.  He is normally constipated, but he has been having looser stools lately.  He reports about 12 pound weight loss since his symptoms started. He denies any new medications.  He denies any known sick contacts.  He lives in [place] with his parents.  His fiancée lives in [place], and he cuts her grass.  He also went paddle [place]ating [place], but denies other significant outdoor exposures.  He has 2 cats and 2 dogs.  He quit smoking, but currently uses e-cigarettes.  He occasionally uses marijuana, last time was about a week or 2 ago.  He denies alcohol use or other illicit drugs.    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  Reportedly this was a very large tumor and he suffers chronic pain from this area.  He gets yearly chest x-rays for surveillance.    Interval History:  Following his clinic visit, his x-ray was concerning for pneumonia.  Because of his e-cigarette use, he was sent for a CT scan of the chest which showed bilateral opacities.  He was directed to the ER for admission.  They are, his O2 sats were 85%.  His procalcitonin was normal.  He was seen by pulmonary and ID.  His presentation was highly suggestive of hypersensitivity pneumonitis.  Antibiotics were discontinued.  Prednisone was started.  He was discharged within a few days and is feeling great.  Denies any fevers.  Feels that he has gained 5 pounds already.  He has some sensitivity to dairy right now.  He denies any shortness of breath or cough.  He is still on a prednisone taper.  He has an appointment in a " few weeks with pulmonary for repeat CT scan.      Review of Systems:  No fevers no rashes    Past Medical History:  Past Medical History:   Diagnosis Date   ? Adhesive arachnoiditis    ? Anxiety    ? Chest pain    ? Chronic pain syndrome    ? Controlled substance agreement signed    ? VASILE (generalized anxiety disorder)    ? Intracranial injury (H)    ? Memory loss    ? Mixed obsessional thoughts and acts    ? OCD (obsessive compulsive disorder)    ? PTSD (post-traumatic stress disorder)    ? Restless leg    ? Rib pain on right side     chronic, pain after tumor removal, possible arachnoiditis   ? Seizure disorder (H)     post traumatic, single episode       Past Surgical History:  Past Surgical History:   Procedure Laterality Date   ? ganglioneuroma  2009    right lung   ? INGUINAL HERNIA REPAIR  2014    right       Allergies:  Allergies   Allergen Reactions   ? Morphine      Other reaction(s): Agitation       Medications:    Current Outpatient Medications:   ?  dimenhyDRINATE (DRAMAMINE) 50 mg Chew, Chew 50 mg every 12 (twelve) hours.    , Disp: , Rfl:   ?  famotidine (PEPCID) 20 MG tablet, Take 1 tablet (20 mg total) by mouth 2 (two) times a day., Disp: , Rfl: 0  ?  gabapentin (NEURONTIN) 800 MG tablet, Take 1,600 mg by mouth 2 (two) times a day.    , Disp: , Rfl:   ?  methadone (DOLOPHINE) 5 MG tablet, Take 10 mg by mouth every 12 (twelve) hours.    , Disp: , Rfl:   ?  PREDNISONE ORAL, Take by mouth 2 (two) times a day. Tapering off. Last day 9/30/19, Disp: , Rfl:   ?  QUEtiapine (SEROQUEL) 25 MG tablet, Take 50 mg by mouth 2 (two) times a day.    , Disp: , Rfl:   ?  sertraline (ZOLOFT) 100 MG tablet, Take 200 mg by mouth daily.    , Disp: , Rfl:   ?  acetaminophen (TYLENOL) 500 MG tablet, Take 1,000 mg by mouth every 4 (four) hours as needed for pain., Disp: , Rfl:   ?  ibuprofen (ADVIL,MOTRIN) 800 MG tablet, Take 800 mg by mouth every 6 (six) hours as needed for pain., Disp: , Rfl:   ?  LORazepam (ATIVAN) 1 MG  tablet, Take 1 mg by mouth 2 (two) times a day as needed for anxiety., Disp: , Rfl:   ?  multivitamin/iron/folic acid (CENTRUM ORAL), Take 1 tablet by mouth daily.    , Disp: , Rfl:   ?  naloxone (NARCAN) 4 mg/actuation nasal spray, USE 4MG INTRANASALY ONCE AS NEEDED, Disp: , Rfl:   ?  ondansetron (ZOFRAN ODT) 4 MG disintegrating tablet, Take 1 tablet (4 mg total) by mouth every 8 (eight) hours as needed for nausea., Disp: 12 tablet, Rfl: 0  ?  oxyCODONE-acetaminophen (PERCOCET/ENDOCET) 5-325 mg per tablet, Take 1 tablet by mouth every 6 (six) hours as needed.    , Disp: , Rfl:     Social History:  Social History     Socioeconomic History   ? Marital status: Single     Spouse name: Not on file   ? Number of children: Not on file   ? Years of education: Not on file   ? Highest education level: Not on file   Occupational History   ? Not on file   Social Needs   ? Financial resource strain: Not on file   ? Food insecurity:     Worry: Not on file     Inability: Not on file   ? Transportation needs:     Medical: Not on file     Non-medical: Not on file   Tobacco Use   ? Smoking status: Former Smoker   ? Smokeless tobacco: Former User     Quit date: 9/11/2019   ? Tobacco comment: 1-5 years, ecig daily use until a few days ago   Substance and Sexual Activity   ? Alcohol use: Not Currently     Frequency: Never     Comment: occassional beer   ? Drug use: Yes     Types: Marijuana     Comment: used marijuana 2-3 weeks ago   ? Sexual activity: Not on file   Lifestyle   ? Physical activity:     Days per week: Not on file     Minutes per session: Not on file   ? Stress: Not on file   Relationships   ? Social connections:     Talks on phone: Not on file     Gets together: Not on file     Attends Druze service: Not on file     Active member of club or organization: Not on file     Attends meetings of clubs or organizations: Not on file     Relationship status: Not on file   ? Intimate partner violence:     Fear of current or ex  partner: Not on file     Emotionally abused: Not on file     Physically abused: Not on file     Forced sexual activity: Not on file   Other Topics Concern   ? Not on file   Social History Narrative   ? Not on file        Family History:  Family History   Problem Relation Age of Onset   ? Depression Father            PHYSICAL EXAM:    Vitals:    09/26/19 1045   BP: 102/74   Pulse: 80   Temp: 98  F (36.7  C)       GENERAL: No distress  HENT:  Head is normocephalic, atraumatic.   EYES:  Eyes have anicteric sclerae without conjunctival injection   LUNGS:  Clear to auscultation.  CARDIOVASCULAR:  Regular rate and rhythm with no murmurs, gallops or rubs.  SKIN:  No acute rashes. No stigmata of endocarditis.  NEUROLOGIC: Grossly nonfocal. Normal gait and station        Pertinent labs:            Lab Results   Component Value Date    CRP 41.0 (H) 09/12/2019         Lab Results   Component Value Date    ALT 33 09/12/2019    AST 44 (H) 09/12/2019    ALKPHOS 105 09/12/2019    BILITOT 0.2 09/12/2019     Outside labs from 9/6: WBC 9.5, hemoglobin 12, platelets 212, elevated neutrophils      Sed rate 87,  HIV negative  Lyme screen negative  CMV IgG positive  EBV IgG positive, EBNA positive    MICROBIOLOGY DATA:  Reviewed    RADIOLOGY:  Reviewed

## 2021-06-19 NOTE — LETTER
Letter by Ryan Bar MD at      Author: Ryan Bar MD Service: -- Author Type: --    Filed:  Encounter Date: 9/12/2019 Status: (Other)         Raphael Eng MD  [address]  [facility] [address]                                  September 12, 2019    Patient: Cresencio Ruiz   MR Number: [id number]   YOB: 1985   Date of Visit: 9/12/2019     Dear Dr. Velasquez MD:    Thank you for referring Cresencio Ruiz to me for evaluation. Below are the relevant portions of my assessment and plan of care.    If you have questions, please do not hesitate to call me. I look forward to following Cresencio along with you.    Sincerely,        Ryan Bar MD          CC  No Recipients  Ryan Bar MD  9/12/2019 11:26 AM  Sign at close encounter  [facility] INFECTIOUS DISEASE CLINIC     Date: 9/12/2019  Patient Name: Cresencio Ruiz   YOB: 1985  MRN: [id number]      ASSESSMENT:  34-year-old man referred to ID clinic for about 2 weeks of fevers, nausea, and weight loss.  Broad differential for fevers.  Given abrupt onset and acute symptoms, likely infectious.  No obvious exposures or travel.  No focal source of infection.  No obvious abnormalities on labs other than elevated sed rate.  Tick and mosquito borne illnesses are a concern.  Acute viral infection could present in this way (both respiratory viruses or arboviruses like West Nile).  CMV and EBV serologies indicate previous infection, likely not contributing to his current symptoms.    PLAN:  -Empiric treatment with doxycycline x2 weeks  -Blood cultures x2, parasite smear, Lyme serologies, Anaplasma/Ehrlichia PCR, Babesia PCR, syphilis screen, urine histoplasma antigen, CRP, ESR, CMP, CBC with differential, LDH  -Chest x-ray  -Continue ibuprofen fevers unless this is causing stomach upset can also combine with acetaminophen.    Return to clinic in 2 weeks.    Ryan Bar MD  [facility]   Clinic phone:  "[phone]   [facility] fax: [phone]     ______________________________________________________________________    HISTORY OF PRESENT ILLNESS:   Cresencio Ruiz is a 34 y.o. man who is referred for evaluation of fevers.  Patient has a history of anxiety, chronic pain OCD, PTSD, and restless leg syndrome.  He was in his normal state of health up until Labor Day developed acute onset of chills and fevers.  He reports daily fevers in the evening.  He also has a \"churning\" feeling in his abdomen, occasionally vomiting after meals.  He has intermittent headache.  He denies sore throat, but does have dry mouth, and is drinking a lot of water.  He has subjective shortness of breath, but no cough or wheezing.  He is normally constipated, but he has been having looser stools lately.  He reports about 12 pound weight loss since his symptoms started. He denies any new medications.  He denies any known sick contacts.  He lives in [place] with his parents.  His fiancée lives in [place], and he cuts her grass.  He also went [place] [facility] [place], but denies other significant outdoor exposures.  He has 2 cats and 2 dogs.  He quit smoking, but currently uses e-cigarettes.  He occasionally uses marijuana, last time was about a week or 2 ago.  He denies alcohol use or other illicit drugs.    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  Reportedly this was a very large tumor and he suffers chronic pain from this area.  He gets yearly chest x-rays for surveillance.    Interval History:  Not applicable      Review of Systems:  Ten systems reviewed and negative except for what is noted in the HPI     Past Medical History:  Past Medical History:   Diagnosis Date   ? Adhesive arachnoiditis    ? Anxiety    ? Chest pain    ? Chronic pain syndrome    ? Controlled substance agreement signed    ? VASILE (generalized anxiety disorder)    ? Intracranial injury (H)    ? Memory loss    ? Mixed " obsessional thoughts and acts    ? OCD (obsessive compulsive disorder)    ? PTSD (post-traumatic stress disorder)    ? Restless leg    ? Rib pain on right side     chronic, pain after tumor removal, possible arachnoiditis   ? Seizure disorder (H)     post traumatic, single episode       Past Surgical History:  Past Surgical History:   Procedure Laterality Date   ? ganglioneuroma  2009    right lung   ? INGUINAL HERNIA REPAIR  2014    right       Allergies:  Allergies   Allergen Reactions   ? Morphine      Other reaction(s): Agitation       Medications:    Current Outpatient Medications:   ?  dimenhyDRINATE (DRAMAMINE) 50 mg Chew, Chew 50 mg every 6 (six) hours., Disp: , Rfl:   ?  gabapentin (NEURONTIN) 800 MG tablet, Take 1,600 mg by mouth., Disp: , Rfl:   ?  ibuprofen (ADVIL,MOTRIN) 800 MG tablet, Take 800 mg by mouth every 6 (six) hours as needed for pain., Disp: , Rfl:   ?  LORazepam (ATIVAN) 1 MG tablet, TAKE 1/2 TO 1 TABLET BY MOUTH 3 TIMES DAILY AS NEEDED FOR ANXIETY, Disp: , Rfl: 0  ?  methadone (DOLOPHINE) 5 MG tablet, , Disp: , Rfl:   ?  multivitamin/iron/folic acid (CENTRUM ORAL), Take by mouth., Disp: , Rfl:   ?  naloxone (NARCAN) 4 mg/actuation nasal spray, USE 4MG INTRANASALY ONCE AS NEEDED, Disp: , Rfl:   ?  ondansetron (ZOFRAN ODT) 4 MG disintegrating tablet, Take 1 tablet (4 mg total) by mouth every 8 (eight) hours as needed for nausea., Disp: 12 tablet, Rfl: 0  ?  oxyCODONE-acetaminophen (PERCOCET/ENDOCET) 5-325 mg per tablet, Take by mouth., Disp: , Rfl:   ?  QUEtiapine (SEROQUEL) 25 MG tablet, Take 25 mg by mouth at bedtime., Disp: , Rfl:   ?  sertraline (ZOLOFT) 100 MG tablet, Take 100 mg by mouth daily., Disp: , Rfl:     Social History:  Social History     Socioeconomic History   ? Marital status: Single     Spouse name: Not on file   ? Number of children: Not on file   ? Years of education: Not on file   ? Highest education level: Not on file   Occupational History   ? Not on file   Social  Needs   ? Financial resource strain: Not on file   ? Food insecurity:     Worry: Not on file     Inability: Not on file   ? Transportation needs:     Medical: Not on file     Non-medical: Not on file   Tobacco Use   ? Smoking status: Former Smoker   ? Smokeless tobacco: Never Used   ? Tobacco comment: 1-5 years, ecig currently using   Substance and Sexual Activity   ? Alcohol use: Not Currently     Frequency: Never     Comment: occassional beer   ? Drug use: Not on file   ? Sexual activity: Not on file   Lifestyle   ? Physical activity:     Days per week: Not on file     Minutes per session: Not on file   ? Stress: Not on file   Relationships   ? Social connections:     Talks on phone: Not on file     Gets together: Not on file     Attends Jain service: Not on file     Active member of club or organization: Not on file     Attends meetings of clubs or organizations: Not on file     Relationship status: Not on file   ? Intimate partner violence:     Fear of current or ex partner: Not on file     Emotionally abused: Not on file     Physically abused: Not on file     Forced sexual activity: Not on file   Other Topics Concern   ? Not on file   Social History Narrative   ? Not on file        Family History:  Family History   Problem Relation Age of Onset   ? Depression Father            PHYSICAL EXAM:    Vitals:    09/12/19 1028   BP: 100/70   Pulse: 100   Temp: 98.2  F (36.8  C)       GENERAL: Thin man, uncomfortable, somewhat anxious  HENT:  Head is normocephalic, atraumatic. Oropharynx is moist without exudates or ulcers.  EYES:  Eyes have anicteric sclerae without conjunctival injection or stigmata of endocarditis.    Lymph: No cervical, axillary, or inguinal lymphadenopathy  LUNGS:  Clear to auscultation.  CARDIOVASCULAR:  Regular rate and rhythm with no murmurs, gallops or rubs.  ABDOMEN:  Normal bowel sounds, soft, nontender. No hepatosplenomegaly.  MUSCULOSKELETAL: Extremities warm and without edema. No  joint swelling.  Tenderness to palpation around the site of his previous surgery on his back.  SKIN:  No acute rashes. No stigmata of endocarditis.  NEUROLOGIC: Grossly nonfocal. Normal gait and station        Pertinent labs:        Results from last 7 days   Lab Units 09/06/19  1220   LN-SODIUM mmol/L 137   LN-POTASSIUM mmol/L 3.9   LN-CHLORIDE mmol/L 98   LN-CO2 mmol/L 27   LN-BLOOD UREA NITROGEN mg/dL 15   LN-CREATININE mg/dL 0.92   LN-CALCIUM mg/dL 9.4     No results found for: CRP      Lab Results   Component Value Date    ALT 25 09/06/2019    AST 28 09/06/2019    ALKPHOS 86 09/06/2019    BILITOT 0.3 09/06/2019     Outside labs from 9/6: WBC 9.5, hemoglobin 12, platelets 212, elevated neutrophils      Sed rate 87,  HIV negative  Lyme screen negative  CMV IgG positive  EBV IgG positive, EBNA positive    MICROBIOLOGY DATA:  None    RADIOLOGY:  None

## 2021-07-03 NOTE — ADDENDUM NOTE
Addendum Note by Dasia Gudino RN at 9/12/2019 10:30 AM     Author: Dasia Gudino RN Service: -- Author Type: Registered Nurse    Filed: 9/13/2019 10:32 AM Encounter Date: 9/12/2019 Status: Signed    : Dasia Gudino RN (Registered Nurse)    Addended by: DASIA GUDINO on: 9/13/2019 10:32 AM        Modules accepted: Orders

## 2021-07-03 NOTE — ADDENDUM NOTE
Addendum Note by Ryan Bar MD at 9/12/2019 10:30 AM     Author: Ryan Bar MD Service: -- Author Type: Physician    Filed: 9/12/2019  2:10 PM Encounter Date: 9/12/2019 Status: Signed    : Ryan Bar MD (Physician)    Addended by: RYAN BAR on: 9/12/2019 02:10 PM        Modules accepted: Orders

## 2021-07-03 NOTE — ADDENDUM NOTE
Addendum Note by Dasia Gudino RN at 9/12/2019 10:30 AM     Author: aDsia Gudino RN Service: -- Author Type: Registered Nurse    Filed: 9/12/2019 11:55 AM Encounter Date: 9/12/2019 Status: Signed    : Dasia Gudino RN (Registered Nurse)    Addended by: DASIA GUDINO on: 9/12/2019 11:55 AM        Modules accepted: Orders

## 2021-07-03 NOTE — ADDENDUM NOTE
Addendum Note by Dasia Gudino RN at 9/12/2019 10:30 AM     Author: Dasia Gudino RN Service: -- Author Type: Registered Nurse    Filed: 9/13/2019 11:01 AM Encounter Date: 9/12/2019 Status: Signed    : Dasia Gudino RN (Registered Nurse)    Addended by: DASIA GUDINO on: 9/13/2019 11:01 AM        Modules accepted: Orders